# Patient Record
Sex: FEMALE | Race: WHITE | URBAN - METROPOLITAN AREA
[De-identification: names, ages, dates, MRNs, and addresses within clinical notes are randomized per-mention and may not be internally consistent; named-entity substitution may affect disease eponyms.]

---

## 2020-12-21 ENCOUNTER — TRANSFERRED RECORDS (OUTPATIENT)
Dept: HEALTH INFORMATION MANAGEMENT | Facility: CLINIC | Age: 50
End: 2020-12-21

## 2021-01-04 ENCOUNTER — TRANSFERRED RECORDS (OUTPATIENT)
Dept: HEALTH INFORMATION MANAGEMENT | Facility: CLINIC | Age: 51
End: 2021-01-04

## 2021-01-06 ENCOUNTER — MEDICAL CORRESPONDENCE (OUTPATIENT)
Dept: HEALTH INFORMATION MANAGEMENT | Facility: CLINIC | Age: 51
End: 2021-01-06

## 2021-01-07 ENCOUNTER — TRANSCRIBE ORDERS (OUTPATIENT)
Dept: OTHER | Age: 51
End: 2021-01-07

## 2021-01-07 DIAGNOSIS — G54.0 THORACIC OUTLET SYNDROME: Primary | ICD-10-CM

## 2021-01-11 ENCOUNTER — TELEPHONE (OUTPATIENT)
Dept: VASCULAR SURGERY | Facility: CLINIC | Age: 51
End: 2021-01-11

## 2021-01-11 NOTE — TELEPHONE ENCOUNTER
Records requested from referring providers office. Will review and schedule appropriately for TOS us, chest x-ray, and visit with Dr. Quevedo.

## 2021-01-13 ENCOUNTER — DOCUMENTATION ONLY (OUTPATIENT)
Dept: VASCULAR SURGERY | Facility: CLINIC | Age: 51
End: 2021-01-13

## 2021-01-13 DIAGNOSIS — G54.0 THORACIC OUTLET SYNDROME: Primary | ICD-10-CM

## 2021-01-23 NOTE — TELEPHONE ENCOUNTER
DIAGNOSIS: TOS   DATE RECEIVED: 1.26.21   NOTES STATUS DETAILS   OFFICE NOTE from referring provider CE 1.7.21, 12.21.20  [facility]   OFFICE NOTE from other specialist CE 8.13.20  Dr. Mae Carroll  [facility]   OPERATIVE REPORT na    MEDICATION LIST CE    PERTINENT LABS CE    CTA (CT ANGIOGRAPHY) na    CT na    MRI Pacs 1.4.21  MR Cervical Spine   ULTRASOUND In process *sched* for 1.26.21  US Upper Ext Arterial and Venous TOS Hector  XR Chest

## 2021-01-26 ENCOUNTER — PRE VISIT (OUTPATIENT)
Dept: VASCULAR SURGERY | Facility: CLINIC | Age: 51
End: 2021-01-26

## 2021-01-26 ENCOUNTER — ANCILLARY PROCEDURE (OUTPATIENT)
Dept: GENERAL RADIOLOGY | Facility: CLINIC | Age: 51
End: 2021-01-26
Attending: SURGERY
Payer: COMMERCIAL

## 2021-01-26 ENCOUNTER — OFFICE VISIT (OUTPATIENT)
Dept: VASCULAR SURGERY | Facility: CLINIC | Age: 51
End: 2021-01-26
Payer: COMMERCIAL

## 2021-01-26 ENCOUNTER — ANCILLARY PROCEDURE (OUTPATIENT)
Dept: ULTRASOUND IMAGING | Facility: CLINIC | Age: 51
End: 2021-01-26
Attending: SURGERY
Payer: COMMERCIAL

## 2021-01-26 VITALS — DIASTOLIC BLOOD PRESSURE: 76 MMHG | HEART RATE: 71 BPM | OXYGEN SATURATION: 100 % | SYSTOLIC BLOOD PRESSURE: 107 MMHG

## 2021-01-26 DIAGNOSIS — G89.29 CHRONIC PAIN OF BOTH SHOULDERS: Primary | ICD-10-CM

## 2021-01-26 DIAGNOSIS — M25.512 CHRONIC PAIN OF BOTH SHOULDERS: Primary | ICD-10-CM

## 2021-01-26 DIAGNOSIS — M25.511 CHRONIC PAIN OF BOTH SHOULDERS: Primary | ICD-10-CM

## 2021-01-26 DIAGNOSIS — G54.0 THORACIC OUTLET SYNDROME: ICD-10-CM

## 2021-01-26 PROCEDURE — 93930 UPPER EXTREMITY STUDY: CPT | Mod: GC | Performed by: RADIOLOGY

## 2021-01-26 PROCEDURE — 93970 EXTREMITY STUDY: CPT | Mod: GC | Performed by: RADIOLOGY

## 2021-01-26 PROCEDURE — 99204 OFFICE O/P NEW MOD 45 MIN: CPT | Performed by: SURGERY

## 2021-01-26 PROCEDURE — 71046 X-RAY EXAM CHEST 2 VIEWS: CPT | Mod: GC | Performed by: RADIOLOGY

## 2021-01-26 RX ORDER — VIT C/B6/B5/MAGNESIUM/HERB 173 50-5-6-5MG
CAPSULE ORAL
COMMUNITY

## 2021-01-26 RX ORDER — PYRIDOXINE HCL (VITAMIN B6) 100 MG
1 TABLET ORAL
COMMUNITY

## 2021-01-26 RX ORDER — CRANBERRY FRUIT EXTRACT 650 MG
CAPSULE ORAL
COMMUNITY
Start: 2020-04-01

## 2021-01-26 RX ORDER — CHLORAL HYDRATE 500 MG
1 CAPSULE ORAL
COMMUNITY

## 2021-01-26 RX ORDER — ASCORBIC ACID 500 MG
1000 TABLET ORAL
COMMUNITY

## 2021-01-26 ASSESSMENT — PAIN SCALES - GENERAL: PAINLEVEL: MODERATE PAIN (5)

## 2021-01-26 NOTE — PATIENT INSTRUCTIONS
Preventive Care:    Breast Cancer Screening: During our visit today, we discussed that it is recommended you receive breast cancer screening. Please call or make an appointment with your primary care provider to discuss this with them. You may also call the [facility] scheduling line ([phone]) to set up a mammography appointment at the [facility] within the [facility].

## 2021-01-26 NOTE — PROGRESS NOTES
Assessment & Plan   Problem List Items Addressed This Visit        Nervous and Auditory    Chronic pain of both shoulders - Primary (Chronic)         Mrs. Suarez is a 51yo F with a couple year history of pain in her bilateral shoulders and arms. She also has pain in her hands and legs. She has had a work up from an immunologist that has thus far been negative. She was referred to me for evaluation of thoracic outlet syndrome.     - Her symptoms are not consistent with thoracic outlet syndrome. She is waking up at night with severe pain and this actually improves throughout the day with activity which is the opposite of what you would see with TOS. I have recommended she see someone with PM&R to help determine the source of her pain.     [facility]  Review of the result(s) of each unique test - Bilateral upper extremity vascular ultrasound with provocative maneuvers, chest xray    Independent interpretation of a test performed by another physician/other qualified health care professional (not separately reported) - Arterial compression at 180 degrees elevation bilaterally.Mild venous compression at 90 degrees and above on the right. No appreciable venous compression on the left. No venous thrombus. CXR without cervical rib.     Discussion of management or test interpretation with external physician/other qualified healthcare professional/appropriate source - Will send to patients PCP.     45 minutes spent on the date of the encounter doing chart review, history and exam, documentation and further activities as noted above           CONSULTATION/REFERRAL to Physical Medicine and Rehab.   Follow up with me as needed.    Mae Quevedo    [facility] SADAF Butler is a 50 year old who presents to clinic today for the following health issues  accompanied by her spouse:    HPI   Mrs. Suarez's  bilateral arm pain symptoms began a few years ago. They first started in her sleep and she would have to elevate her arms on pillows to make the pain go away. She has over the last few weeks been having worsening pain at night. She woke up with severe pain down the left arm the other day which is effecting her sleep. Her pain actually improves throughout the day as she is more active. She does note that last fall someone mentioned neurogenic TOS to her and gave her a handout. She tried to do the stretches recommended but it actually made her symptoms worse so she stopped this. She admits she carries a lot of stress in her neck and back. She was wondering if acupuncture might help.     She does not some pain in her hands that they feel may be a separate issue and she is being referred to rheumatology for this. She has isolated swelling of one joint on one finger on the right that has been stable. It is not red and not tender to touch. She has had an MARITZA, RF and ESR which were normal sent previously.     She denies numbness, tingling, weakness or tiring out of the arms or hands with activity which would be more consistent wih a TOS diagnosis. She feels some pulling in her shoulders and deltoids when she tries to lift or abduct her arms. I explained to her that I don't think any of these symptoms are consistent with TOS. I think it may be helpful for her to see someone with PM&R to see if they can help diagnose and come up with an adequate treatment plan for her arms and shoulders. Her studies show some venous compression on the right and arterial compression bilaterally with 180 degrees of elevation. Her symptoms are bilateral and not consistent with any form of TOS regardless of these imaging studies. It can be seen in many people to have compression without associated symptoms.     Review of Systems   Constitutional, HEENT, cardiovascular, pulmonary, gi and gu systems are negative, except as otherwise noted.       Objective    /76 (BP Location: Right arm, Patient Position: Chair, Cuff Size: Adult Regular)   Pulse 71   SpO2 100%   There is no height or weight on file to calculate BMI.  Physical Exam   GENERAL: healthy, alert and no distress  EYES: Eyes grossly normal to inspection, conjunctivae and sclerae normal  NECK: no adenopathy, no asymmetry, masses, or scars and thyroid normal to palpation  RESP: lungs clear to auscultation - no rales, rhonchi or wheezes  CV: regular rate and rhythm, normal S1 S2, no murmur, no peripheral edema and peripheral pulses strong  ABDOMEN: soft, nontender, no hepatosplenomegaly, no masses and bowel sounds normal  MS: no gross musculoskeletal defects noted, no edema  No swelling in right arm. She has decrease in radial pulse at 180 degrees elevation.   SKIN: no suspicious lesions or rashes  NEURO: Normal strength and tone, mentation intact and speech normal  PSYCH: mentation appears normal, affect normal/bright    Us Thoracic Outlet Syndrome (vascular Lab)    Result Date: 1/26/2021  THORACIC INLET/OUTLET DUPLEX ULTRASOUND 1/26/2021 11:05 AM CLINICAL HISTORY: Thoracic outlet syndrome. COMPARISONS: None available. REFERRING PROVIDER: ERIN KINGSTON TECHNIQUE: Bilateral innominate, subclavian, and axillary veins were evaluated grayscale, color Doppler, Doppler waveform ultrasound. Bilateral subclavian veins were evaluated with Doppler waveform imaging through abduction maneuvers. Bilateral index finger PPG's obtained at rest and with provocative positions. FINDINGS: RIGHT:      REST:           INNOMINATE VEIN: 91 cm/s, phasic           SUBCLAVIAN VEIN, medial: 60 cm/s, phasic           SUBCLAVIAN VEIN, mid: 45 cm/s, phasic, fully compressible           SUBCLAVIAN VEIN, lateral: 24 cm/s, phasic, fully compressible           AXILLARY VEIN: 36 cm/s, phasic, fully compressible      MID SUBCLAVIAN VEIN, sitting erect:           0 degrees: 112 cm/s, phasic           90 degrees: 76 cm/s,  flattened           135 degrees: 30 cm/s, flat monophasic           180 degrees: 88 cm/s, flattened      PPGs:           Baseline: Normal           Arms 90: Normal           Arms 180: ABNORMAL           : Normal            head right: Normal            head left: Normal LEFT:      REST:           INNOMINATE VEIN: 53 cm/s, phasic           SUBCLAVIAN VEIN, medial: 30 cm/s, phasic           SUBCLAVIAN VEIN, mid: 33 cm/s, phasic, fully compressible           SUBCLAVIAN VEIN, lateral: 13 cm/s, phasic, fully compressible           AXILLARY VEIN: 28 cm/s, phasic, fully compressible      MID SUBCLAVIAN VEIN, sitting erect:           0 degrees: 95 cm/s, phasic           90 degrees: 226 cm/s, phasic           135 degrees: 184 cm/s, phasic           180 degrees: 182 cm/s, phasic     PPGs:           Baseline: Normal           Arms 90: Normal           Arms 180: ABNORMAL           : Normal            head right: Normal            head left: Normal     IMPRESSION: Thoracic outlet/inlet physiology suggested. Correlate for symptoms. 1. RIGHT:      A. No subclavian venous stenosis suggested at rest.      B. Subclavian vein velocity decrease by more than fourfold at 135 degrees suggests narrowing.      C. PPG becomes abnormal in Arms 180. 2. LEFT:      A. No subclavian venous stenosis suggested at rest.      B. Subclavian vein velocity near doubling and more than doubling with maneuvers suggests a degree of narrowing.      C. PPG becomes abnormal in Arms 180. I have personally reviewed the examination and initial interpretation and I agree with the findings. SOSA MERIDA MD

## 2021-01-26 NOTE — LETTER
1/26/2021       RE: Carrie Suarez  [address]  [place] [address]     Dear Colleague,    Thank you for referring your patient, Carrie Suarez, to the [facility] [place] [facility]. Please see a copy of my visit note below.      Assessment & Plan   Problem List Items Addressed This Visit        Nervous and Auditory    Chronic pain of both shoulders - Primary (Chronic)         Mrs. Suarez is a 49yo F with a couple year history of pain in her bilateral shoulders and arms. She also has pain in her hands and legs. She has had a work up from an immunologist that has thus far been negative. She was referred to me for evaluation of thoracic outlet syndrome.     - Her symptoms are not consistent with thoracic outlet syndrome. She is waking up at night with severe pain and this actually improves throughout the day with activity which is the opposite of what you would see with TOS. I have recommended she see someone with PM&R to help determine the source of her pain.     [facility] of [facility] external [facility]  Review of the result(s) of each unique test - Bilateral upper extremity vascular ultrasound with provocative maneuvers, chest xray    Independent interpretation of a test performed by another physician/other qualified health care professional (not separately reported) - Arterial compression at 180 degrees elevation bilaterally.Mild venous compression at 90 degrees and above on the right. No appreciable venous compression on the left. No venous thrombus. CXR without cervical rib.     Discussion of management or test interpretation with external physician/other qualified healthcare professional/appropriate source - Will send to patients PCP.     45 minutes spent on the date of the encounter doing chart review, history and exam, documentation and further activities as noted above            CONSULTATION/REFERRAL to Physical Medicine and Rehab.   Follow up with me as needed.    Mae Quevedo    [facility] SADAF Butler is a 50 year old who presents to clinic today for the following health issues  accompanied by her spouse:    KRISTINE Suarez's bilateral arm pain symptoms began a few years ago. They first started in her sleep and she would have to elevate her arms on pillows to make the pain go away. She has over the last few weeks been having worsening pain at night. She woke up with severe pain down the left arm the other day which is effecting her sleep. Her pain actually improves throughout the day as she is more active. She does note that last fall someone mentioned neurogenic TOS to her and gave her a handout. She tried to do the stretches recommended but it actually made her symptoms worse so she stopped this. She admits she carries a lot of stress in her neck and back. She was wondering if acupuncture might help.     She does not some pain in her hands that they feel may be a separate issue and she is being referred to rheumatology for this. She has isolated swelling of one joint on one finger on the right that has been stable. It is not red and not tender to touch. She has had an MARITZA, RF and ESR which were normal sent previously.     She denies numbness, tingling, weakness or tiring out of the arms or hands with activity which would be more consistent wih a TOS diagnosis. She feels some pulling in her shoulders and deltoids when she tries to lift or abduct her arms. I explained to her that I don't think any of these symptoms are consistent with TOS. I think it may be helpful for her to see someone with PM&R to see if they can help diagnose and come up with an adequate treatment plan for her arms and shoulders. Her studies show some venous compression on the right and arterial compression bilaterally with 180 degrees of elevation. Her symptoms  are bilateral and not consistent with any form of TOS regardless of these imaging studies. It can be seen in many people to have compression without associated symptoms.     Review of Systems   Constitutional, HEENT, cardiovascular, pulmonary, gi and gu systems are negative, except as otherwise noted.      Objective    /76 (BP Location: Right arm, Patient Position: Chair, Cuff Size: Adult Regular)   Pulse 71   SpO2 100%   There is no height or weight on file to calculate BMI.  Physical Exam   GENERAL: healthy, alert and no distress  EYES: Eyes grossly normal to inspection, conjunctivae and sclerae normal  NECK: no adenopathy, no asymmetry, masses, or scars and thyroid normal to palpation  RESP: lungs clear to auscultation - no rales, rhonchi or wheezes  CV: regular rate and rhythm, normal S1 S2, no murmur, no peripheral edema and peripheral pulses strong  ABDOMEN: soft, nontender, no hepatosplenomegaly, no masses and bowel sounds normal  MS: no gross musculoskeletal defects noted, no edema  No swelling in right arm. She has decrease in radial pulse at 180 degrees elevation.   SKIN: no suspicious lesions or rashes  NEURO: Normal strength and tone, mentation intact and speech normal  PSYCH: mentation appears normal, affect normal/bright    Us Thoracic Outlet Syndrome (vascular Lab)    Result Date: 1/26/2021  THORACIC INLET/OUTLET DUPLEX ULTRASOUND 1/26/2021 11:05 AM CLINICAL HISTORY: Thoracic outlet syndrome. COMPARISONS: None available. REFERRING PROVIDER: ERIN KINGSTON TECHNIQUE: Bilateral innominate, subclavian, and axillary veins were evaluated grayscale, color Doppler, Doppler waveform ultrasound. Bilateral subclavian veins were evaluated with Doppler waveform imaging through abduction maneuvers. Bilateral index finger PPG's obtained at rest and with provocative positions. FINDINGS: RIGHT:      REST:           INNOMINATE VEIN: 91 cm/s, phasic           SUBCLAVIAN VEIN, medial: 60 cm/s, phasic            SUBCLAVIAN VEIN, mid: 45 cm/s, phasic, fully compressible           SUBCLAVIAN VEIN, lateral: 24 cm/s, phasic, fully compressible           AXILLARY VEIN: 36 cm/s, phasic, fully compressible      MID SUBCLAVIAN VEIN, sitting erect:           0 degrees: 112 cm/s, phasic           90 degrees: 76 cm/s, flattened           135 degrees: 30 cm/s, flat monophasic           180 degrees: 88 cm/s, flattened      PPGs:           Baseline: Normal           Arms 90: Normal           Arms 180: ABNORMAL           : Normal            head right: Normal            head left: Normal LEFT:      REST:           INNOMINATE VEIN: 53 cm/s, phasic           SUBCLAVIAN VEIN, medial: 30 cm/s, phasic           SUBCLAVIAN VEIN, mid: 33 cm/s, phasic, fully compressible           SUBCLAVIAN VEIN, lateral: 13 cm/s, phasic, fully compressible           AXILLARY VEIN: 28 cm/s, phasic, fully compressible      MID SUBCLAVIAN VEIN, sitting erect:           0 degrees: 95 cm/s, phasic           90 degrees: 226 cm/s, phasic           135 degrees: 184 cm/s, phasic           180 degrees: 182 cm/s, phasic     PPGs:           Baseline: Normal           Arms 90: Normal           Arms 180: ABNORMAL           : Normal            head right: Normal            head left: Normal     IMPRESSION: Thoracic outlet/inlet physiology suggested. Correlate for symptoms. 1. RIGHT:      A. No subclavian venous stenosis suggested at rest.      B. Subclavian vein velocity decrease by more than fourfold at 135 degrees suggests narrowing.      C. PPG becomes abnormal in Arms 180. 2. LEFT:      A. No subclavian venous stenosis suggested at rest.      B. Subclavian vein velocity near doubling and more than doubling with maneuvers suggests a degree of narrowing.      C. PPG becomes abnormal in Arms 180. I have personally reviewed the examination and initial interpretation and I agree with the findings. SOSA MERIDA MD        Again,  thank you for allowing me to participate in the care of your patient.      Sincerely,    Mae Quevedo

## 2021-01-26 NOTE — NURSING NOTE
Vascular Rooming Note     Carrie Suarez's goals for this visit include:   Chief Complaint   Patient presents with     Consult     Carrie, is being seen today for builateral TOS, condition is getting worse, left arm is worse, pain is worse at night, started approximatly 2 years ago, as reported by patient.     Niki House LPN

## 2021-01-26 NOTE — LETTER
Date:March 26, 2021      Patient was self referred, no letter generated. Do not send.        [facility] Health Information

## 2021-01-27 DIAGNOSIS — M25.511 CHRONIC PAIN OF BOTH SHOULDERS: Primary | ICD-10-CM

## 2021-01-27 DIAGNOSIS — M25.512 CHRONIC PAIN OF BOTH SHOULDERS: Primary | ICD-10-CM

## 2021-01-27 DIAGNOSIS — G89.29 CHRONIC PAIN OF BOTH SHOULDERS: Primary | ICD-10-CM

## 2021-02-11 ENCOUNTER — TELEPHONE (OUTPATIENT)
Dept: VASCULAR SURGERY | Facility: CLINIC | Age: 51
End: 2021-02-11

## 2021-02-11 NOTE — TELEPHONE ENCOUNTER
[facility] Call Center    Phone Message    May a detailed message be left on voicemail: yes     Reason for Call: Other: Ingrid from [facility] called regarding a referral that was faxed to them for this pt. They are missing a few information. They need the referral order, demographics and office notes. Please resend the referral. If you have any questions, feel free to reach out to them. Thanks     Fax: [phone]    Action Taken: Message routed to:  [facility] ([facility] VAS    Travel Screening: Not Applicable

## 2021-09-22 ENCOUNTER — TRANSFERRED RECORDS (OUTPATIENT)
Dept: HEALTH INFORMATION MANAGEMENT | Facility: CLINIC | Age: 51
End: 2021-09-22